# Patient Record
Sex: MALE | URBAN - METROPOLITAN AREA
[De-identification: names, ages, dates, MRNs, and addresses within clinical notes are randomized per-mention and may not be internally consistent; named-entity substitution may affect disease eponyms.]

---

## 2021-12-07 ENCOUNTER — OFFICE VISIT (OUTPATIENT)
Dept: URBAN - METROPOLITAN AREA CLINIC 11 | Facility: CLINIC | Age: 51
End: 2021-12-07

## 2021-12-07 PROCEDURE — 99204 OFFICE O/P NEW MOD 45 MIN: CPT | Performed by: OPTOMETRIST

## 2021-12-07 RX ORDER — TOBRAMYCIN 3 MG/ML
0.3 % SOLUTION/ DROPS OPHTHALMIC
Qty: 5 | Refills: 1 | Status: INACTIVE
Start: 2021-12-07 | End: 2021-12-10

## 2021-12-07 RX ORDER — TOBRAMYCIN AND DEXAMETHASONE 3; 1 MG/ML; MG/ML
SUSPENSION/ DROPS OPHTHALMIC
Qty: 10 | Refills: 1 | Status: INACTIVE
Start: 2021-12-07 | End: 2021-12-07

## 2021-12-07 NOTE — IMPRESSION/PLAN
Impression: Central corneal ulcer, right eye: H16.011. Plan: Start Besivance 1gtt every hour OD. RX tobramycin 1gtt every hour OD. D/c Tobradex. Start  Poly-Dana SIGRID at night OD (sample given in office). Alternate both drops every 30 minutes. 
Refer to Dr. Jacque Wong in 1-2 days

## 2021-12-10 ENCOUNTER — OFFICE VISIT (OUTPATIENT)
Dept: URBAN - METROPOLITAN AREA CLINIC 33 | Facility: CLINIC | Age: 51
End: 2021-12-10

## 2021-12-10 PROCEDURE — 99203 OFFICE O/P NEW LOW 30 MIN: CPT | Performed by: OPHTHALMOLOGY

## 2021-12-10 PROCEDURE — 76514 ECHO EXAM OF EYE THICKNESS: CPT | Performed by: OPHTHALMOLOGY

## 2021-12-10 RX ORDER — PREDNISOLONE ACETATE 10 MG/ML
1 % SUSPENSION/ DROPS OPHTHALMIC
Qty: 10 | Refills: 2 | Status: INACTIVE
Start: 2021-12-10 | End: 2022-01-03

## 2021-12-10 ASSESSMENT — KERATOMETRY
OD: 52.88
OS: 46.25

## 2021-12-10 ASSESSMENT — INTRAOCULAR PRESSURE: OS: 10

## 2021-12-10 NOTE — IMPRESSION/PLAN
Impression: Central corneal ulcer, right eye: H16.011. Right. Plan:  Discussed diagnosis in detail with patient. Discussed treatment options with patient. Advised patient of condition. re-start Tobradex q1hr OD, start Prednisolone TID OD. Return to [facility] for follow up.

## 2021-12-27 ENCOUNTER — OFFICE VISIT (OUTPATIENT)
Dept: URBAN - METROPOLITAN AREA CLINIC 11 | Facility: CLINIC | Age: 51
End: 2021-12-27

## 2021-12-27 DIAGNOSIS — H16.011 CENTRAL CORNEAL ULCER, RIGHT EYE: Primary | ICD-10-CM

## 2021-12-27 PROCEDURE — 99213 OFFICE O/P EST LOW 20 MIN: CPT | Performed by: OPTOMETRIST

## 2021-12-27 ASSESSMENT — INTRAOCULAR PRESSURE
OD: 14
OS: 15

## 2021-12-27 NOTE — IMPRESSION/PLAN
Impression: Central corneal ulcer, right eye: H16.011. Right. Condition: improving. Plan: pt ran out of tobradex. Sample besivance given - 1gt tid OD. decrease PF to 1gt bid OD. call if symptoms worsen. f/u 2wks.

## 2022-01-03 ENCOUNTER — OFFICE VISIT (OUTPATIENT)
Dept: URBAN - METROPOLITAN AREA CLINIC 11 | Facility: CLINIC | Age: 52
End: 2022-01-03

## 2022-01-03 PROCEDURE — 99213 OFFICE O/P EST LOW 20 MIN: CPT | Performed by: OPTOMETRIST

## 2022-01-03 RX ORDER — BESIFLOXACIN 6 MG/ML
0.6 % SUSPENSION OPHTHALMIC
Qty: 30 | Refills: 3 | Status: INACTIVE
Start: 2021-12-27 | End: 2022-01-09

## 2022-01-03 RX ORDER — PREDNISOLONE ACETATE 10 MG/ML
1 % SUSPENSION/ DROPS OPHTHALMIC
Qty: 15 | Refills: 1 | Status: ACTIVE
Start: 2022-01-03

## 2022-01-03 NOTE — IMPRESSION/PLAN
Impression: Central corneal ulcer, right eye: H16.011. Right. Condition: worsening Plan: Increase besivance (sample dispensed today) 1gt QID OD. Increase PF to 1gt 6x/day OD. call if symptoms worsen.  f/u 3 days

## 2022-01-05 ENCOUNTER — OFFICE VISIT (OUTPATIENT)
Dept: URBAN - METROPOLITAN AREA CLINIC 11 | Facility: CLINIC | Age: 52
End: 2022-01-05

## 2022-01-05 PROCEDURE — 99214 OFFICE O/P EST MOD 30 MIN: CPT | Performed by: OPTOMETRIST

## 2022-01-05 RX ORDER — TOBRAMYCIN 3 MG/ML
0.3 % SOLUTION/ DROPS OPHTHALMIC
Qty: 10 | Refills: 1 | Status: ACTIVE
Start: 2022-01-05

## 2022-01-05 NOTE — IMPRESSION/PLAN
Impression: Central corneal ulcer, right eye: H16.011. Right. Condition: worsening Plan: RX tobramycin 1gtt every two hours OD. Increase besivance 1gtt every two hours OD. Decrease PF to 1gt 4x/day OD. Call if symptoms worsen. f/u 2 days with Dr. Adela Salas.

## 2022-01-07 ENCOUNTER — OFFICE VISIT (OUTPATIENT)
Dept: URBAN - METROPOLITAN AREA CLINIC 33 | Facility: CLINIC | Age: 52
End: 2022-01-07

## 2022-01-07 PROCEDURE — 99213 OFFICE O/P EST LOW 20 MIN: CPT | Performed by: OPHTHALMOLOGY

## 2022-01-07 NOTE — IMPRESSION/PLAN
Impression: Central corneal ulcer, right eye: H16.011. Condition: quality of life issue. Symptoms: will treat with meds. Plan: Discussed diagnosis in detail with patient. Discussed treatment options with patient. Current therapy is best for patient. Continue using current medication(s). Besivance and Tobramycin Q1H OD and Pred as prescribed. Observe. Call if symptoms worsen.

## 2022-01-10 ENCOUNTER — OFFICE VISIT (OUTPATIENT)
Dept: URBAN - METROPOLITAN AREA CLINIC 11 | Facility: CLINIC | Age: 52
End: 2022-01-10

## 2022-01-10 PROCEDURE — 99213 OFFICE O/P EST LOW 20 MIN: CPT | Performed by: OPTOMETRIST

## 2022-01-10 NOTE — IMPRESSION/PLAN
Impression: Central corneal ulcer, right eye: H16.011. Condition: quality of life issue. Symptoms: will treat with meds. Plan: Pt stopped drops two days ago due to irritation. Pt education on importance of drops. Pt to increase Besivance and Tobramycin Q1H OD instead of every two hours. Cont Pred 1gtt 4x/day OD. Monitor. Call if symptoms worsen. F/u with Dr. Sahara Quigley in 2 days.

## 2022-01-12 ENCOUNTER — OFFICE VISIT (OUTPATIENT)
Dept: URBAN - METROPOLITAN AREA CLINIC 23 | Facility: CLINIC | Age: 52
End: 2022-01-12

## 2022-01-12 PROCEDURE — 99212 OFFICE O/P EST SF 10 MIN: CPT | Performed by: OPHTHALMOLOGY

## 2022-01-12 NOTE — IMPRESSION/PLAN
Impression: Central corneal ulcer, right eye: H16.011. Condition: worsening. Symptoms: will treat with meds. Plan: Discussed diagnosis in detail with patient. Discussed treatment options with patient. Culture performed OD today. Specimen sent to [facility] conf# [id number] Continue Tobramycin Q30 minutes. Stop Besivance. PF QID OD. Observe closely. Call if symptoms worsen.

## 2022-01-18 ENCOUNTER — OFFICE VISIT (OUTPATIENT)
Dept: URBAN - METROPOLITAN AREA CLINIC 23 | Facility: CLINIC | Age: 52
End: 2022-01-18

## 2022-01-18 PROCEDURE — 99211 OFF/OP EST MAY X REQ PHY/QHP: CPT | Performed by: OPHTHALMOLOGY

## 2022-01-18 RX ORDER — HYDROCODONE BITARTRATE AND ACETAMINOPHEN 10; 325 MG/1; MG/1
TABLET ORAL
Qty: 18 | Refills: 0 | Status: ACTIVE
Start: 2022-01-18

## 2022-01-18 NOTE — IMPRESSION/PLAN
Impression: Central corneal ulcer, right eye: H16.011. Right. Condition: worsening. Symptoms: will treat with meds. Plan: Discussed diagnosis in detail with patient. Discussed treatment options with patient. Restart Besivance Q30 minutes alternating with Tobramycin Q30 minutes. Continue PF TID-QID OD. 3 samples of besivance dispensed today. Rx sent for oral pain medication. OK to dispense additional Besivance samples.